# Patient Record
Sex: MALE | Race: BLACK OR AFRICAN AMERICAN | NOT HISPANIC OR LATINO | URBAN - METROPOLITAN AREA
[De-identification: names, ages, dates, MRNs, and addresses within clinical notes are randomized per-mention and may not be internally consistent; named-entity substitution may affect disease eponyms.]

---

## 2024-04-17 ENCOUNTER — EMERGENCY (EMERGENCY)
Facility: HOSPITAL | Age: 29
LOS: 1 days | Discharge: HOME | End: 2024-04-17
Attending: EMERGENCY MEDICINE | Admitting: EMERGENCY MEDICINE
Payer: COMMERCIAL

## 2024-04-17 VITALS
OXYGEN SATURATION: 100 % | SYSTOLIC BLOOD PRESSURE: 144 MMHG | TEMPERATURE: 98 F | HEART RATE: 78 BPM | RESPIRATION RATE: 18 BRPM | DIASTOLIC BLOOD PRESSURE: 92 MMHG

## 2024-04-17 PROCEDURE — 99283 EMERGENCY DEPT VISIT LOW MDM: CPT | Mod: 25

## 2024-04-17 RX ORDER — ACETAMINOPHEN 500 MG
975 TABLET ORAL ONCE
Refills: 0 | Status: COMPLETED | OUTPATIENT
Start: 2024-04-17 | End: 2024-04-17

## 2024-04-17 RX ADMIN — Medication 975 MILLIGRAM(S): at 07:35

## 2024-04-17 NOTE — ED ADULT TRIAGE NOTE - CHIEF COMPLAINT QUOTE
Patient transfer for opthalmology for lac to L eyelid while playing basketball. Denies vision changes and other trauma. Laceration noted to eyelid. No hx.

## 2024-04-17 NOTE — ED PROVIDER NOTE - CLINICAL SUMMARY MEDICAL DECISION MAKING FREE TEXT BOX
Left eyelid laceration.  Eyelid repair done by ophthalmology.  Will give patient topical erythromycin ointment per ophthalmology recommendations.  Patient to be discharged and will follow-up for suture removal in 1 week.

## 2024-04-17 NOTE — ED PROVIDER NOTE - OBJECTIVE STATEMENT
28yo male presents with lac to the left upper eyelid laceration.  Patient was elbowed in the eye by a friend playing basketball last night.  He was patient was transferred here for repair by ophthalmology from [facility].  no vision changes.  no other injuries.  last tetanus immunization was 4 years ago.

## 2024-04-17 NOTE — CONSULT NOTE ADULT - SUBJECTIVE AND OBJECTIVE BOX
[facility] DEPARTMENT OF OPHTHALMOLOGY - INITIAL ADULT CONSULT  ----------------------------------------------------------------------------------------------------  Eddie Green MD PGY-2  Available on teams  ----------------------------------------------------------------------------------------------------    HPI: 30yo male presents with lac to the left upper eyelid laceration.  Patient was elbowed in the eye by a friend playing basketball last night.  He was patient was transferred here for repair by ophthalmology from [facility].  no vision changes.  no other injuries.  last tetanus immunization was 4 years ago.    Interval History: Pt was elbowed in the face while playing basketball.    PAST MEDICAL & SURGICAL HISTORY:    Past Ocular History: none  Ophthalmic Medications: none  FAMILY HISTORY:    MEDICATIONS  (STANDING):    MEDICATIONS  (PRN):    Allergies & Intolerances:   ibuprofen (Unknown)    Review of Systems:  Constitutional: No fever, chills  Eyes: blurry vision OS  Neuro: No tremors  Cardiovascular: No chest pain, palpitations  Respiratory: No SOB, no cough  GI: No nausea, vomiting, abdominal pain  : No dysuria  Skin: no rash  Psych: no depression  Endocrine: no polyuria, polydipsia  Heme/lymph: no swelling    VITALS: T(C): 36.7 (04-17-24 @ 06:05)  T(F): 98 (04-17-24 @ 06:05), Max: 98 (04-17-24 @ 06:05)  HR: 78 (04-17-24 @ 06:05) (78 - 78)  BP: 144/92 (04-17-24 @ 06:05) (144/92 - 144/92)  RR:  (18 - 18)  SpO2:  (100% - 100%)  Wt(kg): --  General: AAO x 3, appropriate mood and affect    Ophthalmology Exam:  Visual acuity (sc): 20/20 OD, 20/20 OS  Pupils: PERRL OU, no APD  Ttono: 15 OD, 11 OS  Extraocular movements (EOMs): Full OU, no pain, no diplopia  Confrontational Visual Field (CVF): Full OU    Pen Light Exam (PLE)  External: Flat OU  Lids/Lashes/Lacrimal Ducts: Flat OD, margin involving upper lid laceration OS   Sclera/Conjunctiva: W+Q OD, subconj heme OS  Cornea: Cl OU  Anterior Chamber: D+F OU    Iris: Flat OU  Lens: Cl OU    Fundus Exam: dilated with 1% tropicamide and 2.5% phenylephrine  Approval obtained from ED for dilation  Patient aware that pupils can remained dilated for at least 4-6 hours  Exam performed with 20D lens    Vitreous: wnl OU  Disc, cup/disc: sharp and pink, 0.3 OU  Macula: wnl OU  Vessels: wnl OU  Periphery: wnl OU    Labs/Imaging:  ***

## 2024-04-17 NOTE — ED PROVIDER NOTE - PATIENT PORTAL LINK FT
You can access the FollowMyHealth Patient Portal offered by [facility] by registering at the following website: [email or web address]. By joining [employer]’s FollowMyHealth portal, you will also be able to view your health information using other applications (apps) compatible with our system.

## 2024-04-17 NOTE — ED ADULT NURSE NOTE - OBJECTIVE STATEMENT
29 y male c/o with no past medical history c/o headache and left eye pain. Pt stated he was punched in  L eyelid while playing basketball. Denies vision changes and other trauma. Laceration and swelling noted to eyelid. no acute bleeding. pain med administered as ordered.

## 2024-04-17 NOTE — ED PROVIDER NOTE - NSFOLLOWUPINSTRUCTIONS_ED_ALL_ED_FT
Follow up with ophthalmology in 1 week for suture removal.    Return to the ED for worse pain, swelling, fever, redness, vision change or other emergent concerns.    Take acetaminophen 975mg every 6 hours as needed for pain. Follow up with ophthalmology in 1 week for suture removal.  [facility]  [address] [place] [address]  [phone]     Use erythromycin ointment twice a day to the sutures.  Use artifical tears to the left eye as needed to moisten the eye.    Return to the ED for worse pain, swelling, fever, redness, vision change or other emergent concerns.    Take acetaminophen 975mg every 6 hours as needed for pain.

## 2024-04-17 NOTE — CONSULT NOTE ADULT - ASSESSMENT
29y male with no past ocular history consulted for left upper lid laceration.    #Left upper eyelid laceration  - Pt presented with left upper lid margin involving laceration after being elbowed while playing basketball.  - Repaired in ED, consent obtained and in the chart  - Recommend erythromycin ointment BID to sutures  - Recommend artificial tears QID to left eye and erythromycin ointment qhs for lubrication  - Pt to follow up in 1 week at address below    Discussed with Dr. Morgan.    Outpatient Follow-up: Patient should follow-up with his/her ophthalmologist or with [facility] Department of Ophthalmology within 1 week of after discharge at:    [facility]  [address] [place] [address]  [phone]     Eddie Green MD, PGY-2  Also available on [employer] Teams

## 2024-04-19 DIAGNOSIS — S01.112A LACERATION WITHOUT FOREIGN BODY OF LEFT EYELID AND PERIOCULAR AREA, INITIAL ENCOUNTER: ICD-10-CM

## 2024-04-19 NOTE — PROCEDURE NOTE - NSICDXPROCEDURE_GEN_ALL_CORE_FT
PROCEDURES:  Repair, laceration, full-thickness, eyelid, involving lid margin 19-Apr-2024 13:17:15  Eddie Green

## 2024-04-25 ENCOUNTER — EMERGENCY (EMERGENCY)
Facility: HOSPITAL | Age: 29
LOS: 1 days | Discharge: HOME | End: 2024-04-25
Admitting: STUDENT IN AN ORGANIZED HEALTH CARE EDUCATION/TRAINING PROGRAM
Payer: COMMERCIAL

## 2024-04-25 VITALS
HEART RATE: 92 BPM | OXYGEN SATURATION: 99 % | DIASTOLIC BLOOD PRESSURE: 74 MMHG | RESPIRATION RATE: 18 BRPM | TEMPERATURE: 98 F | SYSTOLIC BLOOD PRESSURE: 117 MMHG

## 2024-04-25 PROCEDURE — 99284 EMERGENCY DEPT VISIT MOD MDM: CPT

## 2024-04-25 NOTE — ED PROVIDER NOTE - PHYSICAL EXAMINATION
1 suture at eyelid margin intact, appears to be well healed, no surrounding inflammation or erythema

## 2024-04-25 NOTE — ED PROVIDER NOTE - CLINICAL SUMMARY MEDICAL DECISION MAKING FREE TEXT BOX
29-year-old male with no stated past medical history presenting to the ER for suture removal.  Patient was seen in the ER last week on 4/17 and he had an eyelid margin laceration repaired by ophthalmology.  Patient states yesterday he went to [facility] ER and had to have sutures removed, states they were unable to remove the third.  Patient presenting to ER requesting suture to be removed.  Patient denies eye pain, swelling, skin changes to the area, vision changes headache, dizziness or any other concerns. On exam patient is well-appearing, vital stable 1 suture at eyelid margin intact, appears to be well-healed, no surrounding inflammation or erythema, no tenderness.  Will discuss plan with Optho as they placed sutures in ER 1 week ago.

## 2024-04-25 NOTE — ED PROVIDER NOTE - BIRTH SEX
11:06 AM  SW called pt's friend to discuss long term placement. Pt has been accepted to [facility]. Friend is back in town. Informed friend that pt is ready to be discharged today and she will need to sign paperwork as well as bring financials to [facility] today. Called [facility] and left message with  for Arcadio or Suki to call back.   11:24 AM  SW received nursing home orders. Faxed orders to [facility]. Will f/u as needed.     Jodi Royal LMSW   [facility]  Ext [phone]     Male

## 2024-04-25 NOTE — ED ADULT TRIAGE NOTE - MODE OF ARRIVAL
Walk in [Nl] : Neurological [NI] : Endocrine [Rash] : rash [Cough] : cough [Nasal Stuffiness] : nasal congestion [Urinary Frequency] : urinary frequency [Smokers in Home] : there are smokers in the home

## 2024-04-25 NOTE — ED PROVIDER NOTE - NS_EDPROVIDERDISPOUSERTYPE_ED_A_ED
1 or 2 I have personally evaluated and examined the patient. The Attending was available to me as a supervising provider if needed.

## 2024-04-25 NOTE — ED ADULT TRIAGE NOTE - CHIEF COMPLAINT QUOTE
Patient had stitches to lef eye lid on 4/17/24, and coming in for stitch removal. Patient states he had 2 stiches removed in ED yesterday and came in today for last stitch removal. PHx- Colitis, Appendectomy.

## 2024-04-25 NOTE — ED PROVIDER NOTE - OBJECTIVE STATEMENT
29-year-old male with no stated past medical history presenting to the ER for suture removal.  Patient was seen in the ER last week on 4/17 and he had an eyelid margin laceration repaired by ophthalmology.  Patient states yesterday he went to [facility] ER and had to have sutures removed, states they were unable to remove the third.  Patient presenting to ER requesting suture to be removed.  Patient denies eye pain, swelling, skin changes to the area, vision changes headache, dizziness or any other concerns.

## 2024-04-25 NOTE — ED PROVIDER NOTE - PROGRESS NOTE DETAILS
STEVEN Chacko: Spoke with ophthalmology resident, Vicryl sutures were placed which are dissolvable, suture does not need to be removed as it is embedded within the close of eyelid margin.  Patient was instructed to follow-up at [facility], will provide patient with clinic information for follow-up.  He will return to the ER with any worsening or concerning symptoms.

## 2024-04-25 NOTE — ED PROVIDER NOTE - NSFOLLOWUPINSTRUCTIONS_ED_ALL_ED_FT
Follow-up with ophthalmology at the [facility], office information listed below please call to make an appointment    [facility]  [address] [place] [address]  [phone]     Follow-up with your primary care doctor  Continue erythromycin ointment twice a day to sutures  Return to the ER with any worsening or concerning symptoms, eye pain, eyelid swelling, redness, warmth, pus drainage, fever or any other concerns.

## 2024-04-25 NOTE — ED PROVIDER NOTE - PATIENT PORTAL LINK FT
You can access the FollowMyHealth Patient Portal offered by [facility] by registering at the following website: [email or web address]. By joining [facility]’s FollowMyHealth portal, you will also be able to view your health information using other applications (apps) compatible with our system.